# Patient Record
Sex: MALE | Race: WHITE
[De-identification: names, ages, dates, MRNs, and addresses within clinical notes are randomized per-mention and may not be internally consistent; named-entity substitution may affect disease eponyms.]

---

## 2018-01-31 ENCOUNTER — HOSPITAL ENCOUNTER (OUTPATIENT)
Dept: HOSPITAL 74 - FASU | Age: 28
Discharge: HOME | End: 2018-01-31
Attending: ORTHOPAEDIC SURGERY
Payer: COMMERCIAL

## 2018-01-31 VITALS — SYSTOLIC BLOOD PRESSURE: 128 MMHG | TEMPERATURE: 97.7 F | DIASTOLIC BLOOD PRESSURE: 82 MMHG | HEART RATE: 78 BPM

## 2018-01-31 VITALS — BODY MASS INDEX: 22.8 KG/M2
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
## 2018-01-31 DIAGNOSIS — S63.054A: Primary | ICD-10-CM

## 2018-01-31 DIAGNOSIS — S62.141A: ICD-10-CM

## 2018-01-31 PROCEDURE — 0RSS04Z REPOSITION RIGHT CARPOMETACARPAL JOINT WITH INTERNAL FIXATION DEVICE, OPEN APPROACH: ICD-10-PCS | Performed by: ORTHOPAEDIC SURGERY

## 2018-01-31 PROCEDURE — 0PSM04Z REPOSITION RIGHT CARPAL WITH INTERNAL FIXATION DEVICE, OPEN APPROACH: ICD-10-PCS | Performed by: ORTHOPAEDIC SURGERY

## 2018-02-01 NOTE — OP
DATE OF OPERATION:  01/31/2018

 

PREOPERATIVE DIAGNOSIS:  

1.  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

2.  Right hamate fracture nonunion. 

 

POSTOPERATIVE DIAGNOSIS:  

1.  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

2.  Right hamate fracture nonunion. 

 

OPERATIVE PROCEDURE:  

1.  Open reduction and internal fixation of right complex chronic multiple 4th and

5th carpometacarpal dislocations.

2.  Repair nonunion of right hamate fracture.   

 

SURGEON:  Aamir Liriano MD

 

ASSISTANT:  PAMELA Kuhn

 

ANESTHESIA:  General.

 

COMPLICATIONS:  None.

 

ESTIMATED BLOOD LOSS:  Minimal.  

 

INDICATIONS FOR PROCEDURE:  The patient is a 27-year-old male who is about 6 months

status post this injury.  He was indicated for operative treatment.  The risks,

benefits, and alternatives were discussed with the patient at length, and proper

informed consent was obtained.  

 

PROCEDURE:  After proper identification of the patient and the correct operative

site, the patient was brought to the operating room and placed supine on the

operating table with prominences well padded.  General anesthesia was provided by the

anesthesiologist.  Intravenous antibiotics were given.  Time-out procedure was

performed.  Right upper extremity was prepped and draped in the usual sterile

fashion.  Well-padded tourniquet was placed with a sterile prep.  Esmarch bandage

used to exsanguinate the right upper extremity.  Tourniquet inflated to 250 mmHg.  

 

A longitudinal incision was made over the dorsal aspect of the hand in line with the

4th and 5th carpometacarpal joints.  Incision was taken sharply through the skin with

blunt and sharp dissection through the subcutaneous tissues.  Capsule over the 4th

and 5th carpometacarpal joints was opened longitudinally and elevated off of the

joints.  Significant scar tissue and calluses were visualized in the area.  

 

The hamate dorsal fragment shear fracture was un-united and freed.  This represented

approximately 40% of the articular surface, and there was some moderate cartilaginous

damage at the distal articular surface.  This was displaced approximately 1 cm below

the articular surface.  Significant scar tissue and callus and bone formation was

present around the 4th and 5th carpometacarpals which were both dislocated dorsally

and proximally.  This was fully released while taking care to protect vital adjacent

structures.  

 

Once the soft tissue was released, the joints were able to be relocated.  The volar

aspect of the hamate and capitate articular surfaces was visualized and found to have

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

surface was present here.  The joints were reduced and held with 2 K-wires crossing

from the 5th to the 4th to the 3rd metacarpals and keeping them in the reduced

position.  These pins were cut short and bent outside of the skin ulnarly.  At this

point, the fracture site was debrided of any soft tissue in order to obtain healthy

bleeding bone.  The hamate dorsal fragment was then replaced with its articular

surface against the base of the 4th and 5th metacarpals.  This was secured using an

OsteoMed 1.6-mm T-plate.  This plate was secured distally with 2 locking screws and a

buttress type effect, as well, and it was secured proximally with 2 screws within the

hamate and 1 screw crossing over to the capitate to obtain better purchase. 

Articular alignment looked good on x-rays.  

 

Wound was irrigated with saline and repaired in layers including the capsule with 3-0

Vicryl, 4-0 Vicryl, and 4-0 nylon sutures.  Sterile dressings were applied.  Splint

was placed.  The patient was reversed from anesthesia and brought to the recovery

room in stable condition.  

 

Andrew Hamlin, the assistant, was integral throughout the procedure.  The

procedure could not have been performed without a skilled operative assistant.  

 

 

OSVALDO MARAVILLA/[id number]

DD: 02/01/2018 13:00

DT: 02/01/2018 13:38

Job #:  [id number]

## 2021-05-27 PROBLEM — Z00.00 ENCOUNTER FOR PREVENTIVE HEALTH EXAMINATION: Status: ACTIVE | Noted: 2021-05-27
